# Patient Record
Sex: FEMALE | Race: WHITE
[De-identification: names, ages, dates, MRNs, and addresses within clinical notes are randomized per-mention and may not be internally consistent; named-entity substitution may affect disease eponyms.]

---

## 2018-10-04 ENCOUNTER — RESULT REVIEW (OUTPATIENT)
Age: 63
End: 2018-10-04

## 2019-03-04 PROBLEM — Z00.00 ENCOUNTER FOR PREVENTIVE HEALTH EXAMINATION: Status: ACTIVE | Noted: 2019-03-04

## 2019-03-08 ENCOUNTER — APPOINTMENT (OUTPATIENT)
Dept: OBGYN | Facility: CLINIC | Age: 64
End: 2019-03-08
Payer: COMMERCIAL

## 2019-03-08 VITALS
BODY MASS INDEX: 27.11 KG/M2 | DIASTOLIC BLOOD PRESSURE: 76 MMHG | WEIGHT: 153 LBS | SYSTOLIC BLOOD PRESSURE: 118 MMHG | HEIGHT: 63 IN

## 2019-03-08 DIAGNOSIS — R30.0 DYSURIA: ICD-10-CM

## 2019-03-08 PROCEDURE — 57150 TREAT VAGINA INFECTION: CPT

## 2019-03-08 PROCEDURE — 81003 URINALYSIS AUTO W/O SCOPE: CPT | Mod: QW

## 2019-03-08 NOTE — HISTORY OF PRESENT ILLNESS
[unknown] : the patient is unsure of the date of her LMP [Menarche Age: ____] : age at menarche was [unfilled] [Post-Menopause, No Sxs] : post-menopausal, currently without symptoms

## 2019-03-12 ENCOUNTER — RX CHANGE (OUTPATIENT)
Age: 64
End: 2019-03-12

## 2019-03-12 LAB
BACTERIA UR CULT: NORMAL
BILIRUB UR QL STRIP: NORMAL
GLUCOSE UR-MCNC: NORMAL
HCG UR QL: 0.2 EU/DL
HGB UR QL STRIP.AUTO: NORMAL
KETONES UR-MCNC: NORMAL
LEUKOCYTE ESTERASE UR QL STRIP: NORMAL
NITRITE UR QL STRIP: NORMAL
PH UR STRIP: 5
PROT UR STRIP-MCNC: NORMAL
SP GR UR STRIP: 1.03

## 2019-03-13 NOTE — REVIEW OF SYSTEMS
[Nl] : Hematologic/Lymphatic [Fever] : no fever [Chills] : no chills [Chest Pain] : no chest pain [Dyspnea] : no shortness of breath [Abdominal Pain] : no abdominal pain [Vomiting] : no vomiting [Pelvic Pain] : no pelvic pain [Frequency] : no frequency [Urgency] : no urgency

## 2019-03-16 ENCOUNTER — TRANSCRIPTION ENCOUNTER (OUTPATIENT)
Age: 64
End: 2019-03-16

## 2019-10-04 ENCOUNTER — RX RENEWAL (OUTPATIENT)
Age: 64
End: 2019-10-04

## 2019-10-10 ENCOUNTER — RECORD ABSTRACTING (OUTPATIENT)
Age: 64
End: 2019-10-10

## 2019-10-10 DIAGNOSIS — Z92.3 PERSONAL HISTORY OF IRRADIATION: ICD-10-CM

## 2019-10-10 DIAGNOSIS — Z87.448 PERSONAL HISTORY OF OTHER DISEASES OF URINARY SYSTEM: ICD-10-CM

## 2019-10-10 DIAGNOSIS — Z92.89 PERSONAL HISTORY OF OTHER MEDICAL TREATMENT: ICD-10-CM

## 2019-10-10 DIAGNOSIS — Z78.9 OTHER SPECIFIED HEALTH STATUS: ICD-10-CM

## 2019-10-10 DIAGNOSIS — Z92.21 PERSONAL HISTORY OF ANTINEOPLASTIC CHEMOTHERAPY: ICD-10-CM

## 2019-10-10 DIAGNOSIS — Z96.0 PRESENCE OF UROGENITAL IMPLANTS: ICD-10-CM

## 2019-10-10 DIAGNOSIS — F32.9 MAJOR DEPRESSIVE DISORDER, SINGLE EPISODE, UNSPECIFIED: ICD-10-CM

## 2019-10-10 DIAGNOSIS — R87.610 ATYPICAL SQUAMOUS CELLS OF UNDETERMINED SIGNIFICANCE ON CYTOLOGIC SMEAR OF CERVIX (ASC-US): ICD-10-CM

## 2019-10-10 DIAGNOSIS — Z83.3 FAMILY HISTORY OF DIABETES MELLITUS: ICD-10-CM

## 2019-10-10 DIAGNOSIS — Z80.3 FAMILY HISTORY OF MALIGNANT NEOPLASM OF BREAST: ICD-10-CM

## 2019-10-10 DIAGNOSIS — Z87.59 PERSONAL HISTORY OF OTHER COMPLICATIONS OF PREGNANCY, CHILDBIRTH AND THE PUERPERIUM: ICD-10-CM

## 2019-10-10 LAB — CYTOLOGY CVX/VAG DOC THIN PREP: NORMAL

## 2019-10-10 RX ORDER — SERTRALINE HYDROCHLORIDE 100 MG/1
100 TABLET, FILM COATED ORAL
Refills: 0 | Status: ACTIVE | COMMUNITY

## 2019-10-10 RX ORDER — EXEMESTANE 25 MG/1
25 TABLET, FILM COATED ORAL
Refills: 0 | Status: ACTIVE | COMMUNITY

## 2019-10-15 ENCOUNTER — APPOINTMENT (OUTPATIENT)
Dept: OBGYN | Facility: CLINIC | Age: 64
End: 2019-10-15
Payer: COMMERCIAL

## 2019-10-15 VITALS
BODY MASS INDEX: 27.46 KG/M2 | DIASTOLIC BLOOD PRESSURE: 80 MMHG | WEIGHT: 155 LBS | SYSTOLIC BLOOD PRESSURE: 122 MMHG | HEIGHT: 63 IN

## 2019-10-15 PROCEDURE — 99214 OFFICE O/P EST MOD 30 MIN: CPT

## 2019-10-15 NOTE — HISTORY OF PRESENT ILLNESS
[1 Year Ago] : 1 year ago [Good] : being in good health [Healthy Diet] : a healthy diet [Last Mammogram ___] : Last Mammogram was [unfilled] [Regular Exercise] : regular exercise [Last Pap ___] : Last cervical pap smear was [unfilled] [Postmenopausal] : is postmenopausal [Pregnancy History] : pregnancy history: [Menarche Age: ____] : age at menarche was [unfilled] [unknown] : the patient is unsure of the date of her LMP [Monogamous] : is monogamous [Contraception] : uses contraception [Tubal Ligation] : has had a tubal ligation [Male ___] : [unfilled] male [Sexually Active] : is sexually active [de-identified] : breast u/s (left breast)- 2/28/18 b4    pelvic u/s- 3/16/11 [Menstrual Problems] : reports normal menses

## 2019-10-15 NOTE — DISCUSSION/SUMMARY
[FreeTextEntry1] : F/U affirm cx.\par \par Pessary care in 3 months with xanax given severe anxiety.

## 2019-10-15 NOTE — END OF VISIT
[FreeTextEntry3] : I, Stewart Oneil, acted solely as a scribe for Dr. Tapia on this date 10/15/2019.\par All medical record entries made by the Scribe were at my, Dr. Tapia’s direction and personally dictated by me on  10/15/2019. I have reviewed the chart and agree that the record accurately reflects my personal performance of the history, physical exam, assessment and plan. I have also personally directed, reviewed, and agreed with the chart.\par \par

## 2019-10-15 NOTE — PHYSICAL EXAM
[Awake] : awake [Alert] : alert [Oriented x3] : oriented to person, place, and time [Labia Minora] : labia minora [Labia Majora] : labia major [No Bleeding] : there was no active vaginal bleeding [Normal] : clitoris [Uterine Adnexae] : were not tender and not enlarged [Acute Distress] : no acute distress [Depressed Mood] : not depressed [Atrophy] : atrophy [Flat Affect] : affect not flat [Pap Obtained] : a Pap smear was not performed

## 2019-10-16 LAB
CANDIDA VAG CYTO: NOT DETECTED
G VAGINALIS+PREV SP MTYP VAG QL MICRO: NOT DETECTED
T VAGINALIS VAG QL WET PREP: NOT DETECTED

## 2020-03-19 ENCOUNTER — APPOINTMENT (OUTPATIENT)
Dept: OBGYN | Facility: CLINIC | Age: 65
End: 2020-03-19
Payer: COMMERCIAL

## 2020-03-19 VITALS
SYSTOLIC BLOOD PRESSURE: 120 MMHG | HEIGHT: 63 IN | DIASTOLIC BLOOD PRESSURE: 70 MMHG | BODY MASS INDEX: 26.05 KG/M2 | WEIGHT: 147 LBS

## 2020-03-19 PROCEDURE — 99214 OFFICE O/P EST MOD 30 MIN: CPT

## 2020-03-19 NOTE — END OF VISIT
[FreeTextEntry3] : I, Stewart Thad, acted solely as a scribe for Dr. Tapia on this date 03/19/2020.\par All medical record entries made by the Scribe were at my, Dr. Tapia's direction and personally dictated by me on  03/19/2020. I have reviewed the chart and agree that the record accurately reflects my personal performance of the history, physical exam, assessment and plan. I have also personally directed, reviewed, and agreed with the chart.\par

## 2020-03-19 NOTE — PHYSICAL EXAM
[Awake] : awake [Alert] : alert [Oriented x3] : oriented to person, place, and time [No Lesions] : no genitalia lesions [Labia Majora] : labia major [Labia Minora] : labia minora [Normal] : clitoris [Atrophy] : atrophy [Dry Mucosa] : dry mucosa [No Bleeding] : there was no active vaginal bleeding [Acute Distress] : no acute distress [Depressed Mood] : not depressed [Flat Affect] : affect not flat

## 2020-03-19 NOTE — REVIEW OF SYSTEMS
[Sight Problems] : sight problems [Anxiety] : anxiety [Depression] : depression [Fever] : no fever [Chills] : no chills [Pelvic Pain] : no pelvic pain [Abn Vag Bleeding] : no abnormal vaginal bleeding

## 2020-03-19 NOTE — HISTORY OF PRESENT ILLNESS
[Last Mammogram ___] : Last Mammogram was [unfilled] [Last Pap ___] : Last cervical pap smear was [unfilled] [Sexually Active] : is sexually active [Male ___] : [unfilled] male

## 2020-07-01 ENCOUNTER — APPOINTMENT (OUTPATIENT)
Dept: OBGYN | Facility: CLINIC | Age: 65
End: 2020-07-01
Payer: COMMERCIAL

## 2020-07-01 VITALS
SYSTOLIC BLOOD PRESSURE: 120 MMHG | BODY MASS INDEX: 26.05 KG/M2 | DIASTOLIC BLOOD PRESSURE: 66 MMHG | HEIGHT: 63 IN | WEIGHT: 147 LBS

## 2020-07-01 PROCEDURE — 99214 OFFICE O/P EST MOD 30 MIN: CPT | Mod: 25

## 2020-07-01 PROCEDURE — 57160 INSERT PESSARY/OTHER DEVICE: CPT

## 2020-07-01 RX ORDER — ALPRAZOLAM 0.5 MG/1
0.5 TABLET ORAL
Qty: 2 | Refills: 0 | Status: DISCONTINUED | COMMUNITY
Start: 2020-03-16 | End: 2020-07-01

## 2020-07-01 RX ORDER — METRONIDAZOLE 7.5 MG/G
0.75 GEL VAGINAL
Qty: 5 | Refills: 0 | Status: DISCONTINUED | COMMUNITY
Start: 2020-03-19 | End: 2020-07-01

## 2020-07-01 RX ORDER — ALPRAZOLAM 1 MG/1
1 TABLET ORAL ONCE
Qty: 1 | Refills: 0 | Status: DISCONTINUED | COMMUNITY
Start: 2020-06-18 | End: 2020-07-01

## 2020-07-01 RX ORDER — ALPRAZOLAM 0.5 MG/1
0.5 TABLET ORAL
Qty: 2 | Refills: 0 | Status: DISCONTINUED | COMMUNITY
Start: 2019-03-07 | End: 2020-07-01

## 2020-07-01 NOTE — HISTORY OF PRESENT ILLNESS
[Good] : being in good health [Regular Exercise] : regular exercise [Healthy Diet] : a healthy diet [Last Mammogram ___] : Last Mammogram was [unfilled] [Postmenopausal] : is postmenopausal [Last Pap ___] : Last cervical pap smear was [unfilled] [Pregnancy History] : pregnancy history: [Total Preg ___] : [unfilled] [Full Term ___] : [unfilled] [AB Spont ___] : miscarriages: [unfilled] [Living ___] : [unfilled] [Monogamous (Male Partner)] : is monogamous with a male partner [unknown] : the patient is unsure of the date of her LMP [Menarche Age: ____] : age at menarche was [unfilled] [Monogamous] : is monogamous [Male ___] : [unfilled] male [Menstrual Problems] : reports normal menses [Weight Concerns] : no concerns with her weight [de-identified] : pelvic u/s 3/16/2011 [Contraception] : does not use contraception [Sexually Active] : is not sexually active

## 2020-07-01 NOTE — REVIEW OF SYSTEMS
[Nl] : Psychiatric [Chest Pain] : no chest pain [Chills] : no chills [Fever] : no fever [Abdominal Pain] : no abdominal pain [Dyspnea] : no shortness of breath [Vomiting] : no vomiting [Dysuria] : no dysuria [Pelvic Pain] : no pelvic pain [Abn Vag Bleeding] : no abnormal vaginal bleeding [Frequency] : no frequency [Urgency] : no urgency [Vaginal Discharge] : vaginal discharge [Vaginal Itching] : no vaginal itching [Vaginal Bleeding] : no vaginal bleeding [Vaginal Odor] : vaginal odor

## 2020-09-04 ENCOUNTER — APPOINTMENT (OUTPATIENT)
Dept: OBGYN | Facility: CLINIC | Age: 65
End: 2020-09-04
Payer: COMMERCIAL

## 2020-09-04 VITALS
BODY MASS INDEX: 25.69 KG/M2 | DIASTOLIC BLOOD PRESSURE: 84 MMHG | WEIGHT: 145 LBS | HEIGHT: 63 IN | SYSTOLIC BLOOD PRESSURE: 132 MMHG

## 2020-09-04 DIAGNOSIS — N39.41 URGE INCONTINENCE: ICD-10-CM

## 2020-09-04 DIAGNOSIS — N92.0 EXCESSIVE AND FREQUENT MENSTRUATION WITH REGULAR CYCLE: ICD-10-CM

## 2020-09-04 LAB
BILIRUB UR QL STRIP: NORMAL
GLUCOSE UR-MCNC: NORMAL
HCG UR QL: 0.2 EU/DL
HGB UR QL STRIP.AUTO: ABNORMAL
KETONES UR-MCNC: NORMAL
LEUKOCYTE ESTERASE UR QL STRIP: ABNORMAL
NITRITE UR QL STRIP: NORMAL
PH UR STRIP: 5.5
PROT UR STRIP-MCNC: NORMAL
SP GR UR STRIP: 1.02

## 2020-09-04 PROCEDURE — 81003 URINALYSIS AUTO W/O SCOPE: CPT | Mod: QW

## 2020-09-04 PROCEDURE — 99214 OFFICE O/P EST MOD 30 MIN: CPT

## 2020-09-04 NOTE — PHYSICAL EXAM
[Awake] : awake [Alert] : alert [Oriented x3] : oriented to person, place, and time [Normal] : external genitalia [Labia Majora] : labia major [Labia Minora] : labia minora [Atrophy] : atrophy [Erythema] : erythema [Discharge] : a  ~M vaginal discharge was present [Heavy] : heavy [Green] : green [Thin] : thin [Tenderness] : tenderness [Acute Distress] : no acute distress [Depressed Mood] : not depressed [Flat Affect] : affect not flat [FreeTextEntry4] : Vagina very irritated and bleeds very easily with exam.

## 2020-09-04 NOTE — HISTORY OF PRESENT ILLNESS
[1 Year Ago] : 1 year ago [Good] : being in good health [Last Mammogram ___] : Last Mammogram was [unfilled] [Last Pap ___] : Last cervical pap smear was [unfilled] [Postmenopausal] : is postmenopausal [unknown] : the patient is unsure of the date of her LMP [Menarche Age: ____] : age at menarche was [unfilled] [de-identified] : BV 10/15/19 NEG

## 2020-09-08 LAB
BACTERIA UR CULT: NORMAL
CANDIDA VAG CYTO: NOT DETECTED
G VAGINALIS+PREV SP MTYP VAG QL MICRO: NOT DETECTED
T VAGINALIS VAG QL WET PREP: NOT DETECTED

## 2020-09-18 ENCOUNTER — APPOINTMENT (OUTPATIENT)
Dept: OBGYN | Facility: CLINIC | Age: 65
End: 2020-09-18
Payer: COMMERCIAL

## 2020-09-18 VITALS
DIASTOLIC BLOOD PRESSURE: 70 MMHG | WEIGHT: 149 LBS | SYSTOLIC BLOOD PRESSURE: 120 MMHG | BODY MASS INDEX: 27.42 KG/M2 | HEIGHT: 62 IN

## 2020-09-18 PROCEDURE — 99212 OFFICE O/P EST SF 10 MIN: CPT

## 2020-09-18 NOTE — HISTORY OF PRESENT ILLNESS
[Patient reported PAP Smear was normal] : Patient reported PAP Smear was normal [LMP unknown] : LMP unknown [N] : Patient is not sexually active [Y] : Positive pregnancy history [unknown] : Patient is unsure of the date of her LMP [Menarche Age: ____] : age at menarche was [unfilled] [Previously active] : previously active [TextBox_4] : Pt is here for a follow up [PapSmeardate] : 10/04/2018  [PGHxTotal] : 4 [[facility]] : 3 [[facility]Living] : 2 [PGHxABSpont] : 1 [FreeTextEntry1] : 1

## 2020-09-18 NOTE — PLAN
[FreeTextEntry1] : Will leave pessary out for now since her prolapse has not recurred and since she feels so good since she does not have any discharge. Will leave pessary out. She will return if the prolapse recurs and she will bring the pessary with her.\par \par She will return in 2-3 months for annual exam.

## 2020-12-10 ENCOUNTER — APPOINTMENT (OUTPATIENT)
Dept: OBGYN | Facility: CLINIC | Age: 65
End: 2020-12-10
Payer: COMMERCIAL

## 2020-12-10 VITALS
DIASTOLIC BLOOD PRESSURE: 78 MMHG | HEIGHT: 62 IN | TEMPERATURE: 97.2 F | BODY MASS INDEX: 27.05 KG/M2 | SYSTOLIC BLOOD PRESSURE: 108 MMHG | WEIGHT: 147 LBS

## 2020-12-10 DIAGNOSIS — N81.4 UTEROVAGINAL PROLAPSE, UNSPECIFIED: ICD-10-CM

## 2020-12-10 DIAGNOSIS — N76.0 ACUTE VAGINITIS: ICD-10-CM

## 2020-12-10 DIAGNOSIS — Z87.42 PERSONAL HISTORY OF OTHER DISEASES OF THE FEMALE GENITAL TRACT: ICD-10-CM

## 2020-12-10 DIAGNOSIS — Z01.419 ENCOUNTER FOR GYNECOLOGICAL EXAMINATION (GENERAL) (ROUTINE) W/OUT ABNORMAL FINDINGS: ICD-10-CM

## 2020-12-10 DIAGNOSIS — N89.8 OTHER SPECIFIED NONINFLAMMATORY DISORDERS OF VAGINA: ICD-10-CM

## 2020-12-10 DIAGNOSIS — F41.9 ANXIETY DISORDER, UNSPECIFIED: ICD-10-CM

## 2020-12-10 DIAGNOSIS — Z87.898 PERSONAL HISTORY OF OTHER SPECIFIED CONDITIONS: ICD-10-CM

## 2020-12-10 DIAGNOSIS — B96.89 ACUTE VAGINITIS: ICD-10-CM

## 2020-12-10 DIAGNOSIS — Z01.411 ENCOUNTER FOR GYNECOLOGICAL EXAMINATION (GENERAL) (ROUTINE) WITH ABNORMAL FINDINGS: ICD-10-CM

## 2020-12-10 DIAGNOSIS — R10.2 PELVIC AND PERINEAL PAIN: ICD-10-CM

## 2020-12-10 DIAGNOSIS — Z12.11 ENCOUNTER FOR SCREENING FOR MALIGNANT NEOPLASM OF COLON: ICD-10-CM

## 2020-12-10 DIAGNOSIS — C50.919 MALIGNANT NEOPLASM OF UNSPECIFIED SITE OF UNSPECIFIED FEMALE BREAST: ICD-10-CM

## 2020-12-10 LAB
DATE COLLECTED: NORMAL
HEMOCCULT SP1 STL QL: NEGATIVE
QUALITY CONTROL: YES

## 2020-12-10 PROCEDURE — 82270 OCCULT BLOOD FECES: CPT

## 2020-12-10 PROCEDURE — 99396 PREV VISIT EST AGE 40-64: CPT

## 2020-12-10 PROCEDURE — 99072 ADDL SUPL MATRL&STAF TM PHE: CPT

## 2020-12-10 RX ORDER — METRONIDAZOLE 7.5 MG/G
0.75 GEL VAGINAL
Qty: 1 | Refills: 3 | Status: DISCONTINUED | COMMUNITY
Start: 2020-07-01 | End: 2020-12-10

## 2020-12-10 RX ORDER — FLUCONAZOLE 150 MG/1
150 TABLET ORAL
Qty: 6 | Refills: 0 | Status: DISCONTINUED | COMMUNITY
Start: 2020-09-04 | End: 2020-12-10

## 2020-12-10 RX ORDER — ALPRAZOLAM 1 MG/1
1 TABLET ORAL
Qty: 14 | Refills: 0 | Status: DISCONTINUED | COMMUNITY
Start: 2020-09-04 | End: 2020-12-10

## 2020-12-10 RX ORDER — METRONIDAZOLE 7.5 MG/G
0.75 GEL VAGINAL
Qty: 1 | Refills: 3 | Status: DISCONTINUED | COMMUNITY
Start: 2020-09-04 | End: 2020-12-10

## 2020-12-10 RX ORDER — ALPRAZOLAM 0.25 MG/1
0.25 TABLET ORAL
Qty: 30 | Refills: 0 | Status: ACTIVE | COMMUNITY
Start: 2020-12-10 | End: 1900-01-01

## 2020-12-10 RX ORDER — ALPRAZOLAM 1 MG/1
1 TABLET ORAL
Qty: 1 | Refills: 0 | Status: DISCONTINUED | COMMUNITY
Start: 2020-09-03 | End: 2020-12-10

## 2020-12-10 NOTE — END OF VISIT
[FreeTextEntry3] : I, Stewart Oneil, acted solely as a scribe for Dr. Tapia on this date 12/10/2020.\par All medical record entries made by the Scribe were at my, Dr. Tapia's direction and personally dictated by me on  12/10/2020. I have reviewed the chart and agree that the record accurately reflects my personal performance of the history, physical exam, assessment and plan. I have also personally directed, reviewed, and agreed with the chart.\par

## 2020-12-10 NOTE — DISCUSSION/SUMMARY
[FreeTextEntry1] : F/U pap\par \par Self breast exam was reviewed.\par \par She requested a RX for Xanax for her anxiety. Rx sent. I told her that I will prescribe a month’s supply but she will need to connect with her PCP for refill.\par \par She will follow up annually and as needed.\par \par \par

## 2020-12-10 NOTE — HISTORY OF PRESENT ILLNESS
[Patient reported PAP Smear was normal] : Patient reported PAP Smear was normal [LMP unknown] : LMP unknown [postmenopausal] : postmenopausal [N] : Patient does not use contraception [Y] : Positive pregnancy history [unknown] : Patient is unsure of the date of her LMP [Menarche Age: ____] : age at menarche was [unfilled] [Currently Active] : currently active [Men] : men [No] : No [Patient reported mammogram was normal] : Patient reported mammogram was normal [FreeTextEntry1] : 65 yo is here for her annual exam. She is doing well. She feels anxious and emotional due to COVID -19 pandemic. She takes Xanax to cope with her anxiety. She requested for renewal Rx for it. She is following up with Dr. Pattonrely for her breast issues.\par \par \par \par   [TextBox_4] : Pt presents for an annual exam [Mammogramdate] : 2020 [TextBox_19] : as per pt [PapSmeardate] : 02/28/2018 [PGHxTotal] : 4 [[facility]] : 3 [[facility]] : 2 [PGHxABSpont] : 1

## 2020-12-12 LAB — HPV HIGH+LOW RISK DNA PNL CVX: NOT DETECTED

## 2020-12-15 LAB — CYTOLOGY CVX/VAG DOC THIN PREP: ABNORMAL

## 2022-10-28 NOTE — DISCUSSION/SUMMARY
[FreeTextEntry1] : Pessary care in 3 months. Will need premedication with xanax. DISPLAY PLAN FREE TEXT

## 2023-10-01 PROBLEM — Z92.3 HISTORY OF RADIATION THERAPY: Status: RESOLVED | Noted: 2019-10-10 | Resolved: 2023-10-01
